# Patient Record
Sex: FEMALE | Race: BLACK OR AFRICAN AMERICAN | NOT HISPANIC OR LATINO | Employment: UNEMPLOYED | URBAN - METROPOLITAN AREA
[De-identification: names, ages, dates, MRNs, and addresses within clinical notes are randomized per-mention and may not be internally consistent; named-entity substitution may affect disease eponyms.]

---

## 2022-04-10 ENCOUNTER — HISTORICAL (OUTPATIENT)
Dept: ADMINISTRATIVE | Facility: HOSPITAL | Age: 52
End: 2022-04-10
Payer: MEDICAID

## 2022-04-26 VITALS
SYSTOLIC BLOOD PRESSURE: 114 MMHG | WEIGHT: 293 LBS | BODY MASS INDEX: 51.91 KG/M2 | HEIGHT: 63 IN | DIASTOLIC BLOOD PRESSURE: 87 MMHG | OXYGEN SATURATION: 99 %

## 2022-07-01 DIAGNOSIS — M06.9 RHEUMATOID ARTHRITIS, INVOLVING UNSPECIFIED SITE, UNSPECIFIED WHETHER RHEUMATOID FACTOR PRESENT: Primary | ICD-10-CM

## 2022-07-01 RX ORDER — METHOTREXATE 2.5 MG/1
15 TABLET ORAL
COMMUNITY
Start: 2021-11-02 | End: 2022-07-01 | Stop reason: SDUPTHER

## 2022-07-01 RX ORDER — METHOTREXATE 2.5 MG/1
15 TABLET ORAL
Qty: 30 TABLET | Refills: 5 | Status: SHIPPED | OUTPATIENT
Start: 2022-07-01 | End: 2023-02-09 | Stop reason: SDUPTHER

## 2022-07-01 RX ORDER — HYDROXYCHLOROQUINE SULFATE 200 MG/1
200 TABLET, FILM COATED ORAL 2 TIMES DAILY
Qty: 60 TABLET | Refills: 5 | Status: SHIPPED | OUTPATIENT
Start: 2022-07-01 | End: 2023-02-09 | Stop reason: SDUPTHER

## 2022-07-01 RX ORDER — MELOXICAM 15 MG/1
15 TABLET ORAL DAILY
COMMUNITY
Start: 2021-11-02 | End: 2022-07-01 | Stop reason: SDUPTHER

## 2022-07-01 RX ORDER — MELOXICAM 15 MG/1
15 TABLET ORAL DAILY
Qty: 30 TABLET | Refills: 5 | Status: SHIPPED | OUTPATIENT
Start: 2022-07-01 | End: 2023-02-09

## 2022-07-01 RX ORDER — HYDROXYCHLOROQUINE SULFATE 200 MG/1
200 TABLET, FILM COATED ORAL 2 TIMES DAILY
COMMUNITY
Start: 2021-11-02 | End: 2022-07-01 | Stop reason: SDUPTHER

## 2022-07-12 DIAGNOSIS — M06.9 RHEUMATOID ARTHRITIS, INVOLVING UNSPECIFIED SITE, UNSPECIFIED WHETHER RHEUMATOID FACTOR PRESENT: Primary | ICD-10-CM

## 2022-07-12 DIAGNOSIS — M79.7 FIBROMYALGIA: ICD-10-CM

## 2022-07-12 RX ORDER — TOPIRAMATE 50 MG/1
50 TABLET, FILM COATED ORAL NIGHTLY
COMMUNITY
Start: 2022-01-29 | End: 2023-02-09

## 2022-07-12 RX ORDER — TOPIRAMATE 50 MG/1
50 TABLET, FILM COATED ORAL NIGHTLY
Qty: 30 TABLET | Refills: 0 | Status: CANCELLED | OUTPATIENT
Start: 2022-07-12

## 2022-07-21 DIAGNOSIS — M79.7 FIBROMYALGIA: ICD-10-CM

## 2022-07-21 DIAGNOSIS — M06.9 RHEUMATOID ARTHRITIS, INVOLVING UNSPECIFIED SITE, UNSPECIFIED WHETHER RHEUMATOID FACTOR PRESENT: Primary | ICD-10-CM

## 2022-07-21 RX ORDER — TOPIRAMATE 25 MG/1
25 TABLET ORAL DAILY
Qty: 30 TABLET | Refills: 5 | Status: SHIPPED | OUTPATIENT
Start: 2022-07-21 | End: 2023-02-09 | Stop reason: SDUPTHER

## 2022-10-04 DIAGNOSIS — M06.9 RHEUMATOID ARTHRITIS, INVOLVING UNSPECIFIED SITE, UNSPECIFIED WHETHER RHEUMATOID FACTOR PRESENT: Primary | ICD-10-CM

## 2022-10-04 DIAGNOSIS — Z79.899 ENCOUNTER FOR LONG-TERM (CURRENT) USE OF OTHER MEDICATIONS: ICD-10-CM

## 2022-10-04 DIAGNOSIS — M79.7 FIBROMYALGIA: ICD-10-CM

## 2022-10-04 RX ORDER — OMEPRAZOLE 40 MG/1
40 CAPSULE, DELAYED RELEASE ORAL DAILY
COMMUNITY
Start: 2021-11-02 | End: 2022-10-04 | Stop reason: SDUPTHER

## 2022-10-04 RX ORDER — CYCLOBENZAPRINE HCL 10 MG
10 TABLET ORAL NIGHTLY
COMMUNITY
Start: 2022-07-18 | End: 2022-10-04 | Stop reason: SDUPTHER

## 2022-10-04 RX ORDER — CYCLOBENZAPRINE HCL 10 MG
10 TABLET ORAL NIGHTLY
Qty: 30 TABLET | Refills: 5 | Status: SHIPPED | OUTPATIENT
Start: 2022-10-04 | End: 2023-02-09

## 2022-10-04 RX ORDER — OMEPRAZOLE 40 MG/1
40 CAPSULE, DELAYED RELEASE ORAL DAILY
Qty: 30 CAPSULE | Refills: 5 | Status: SHIPPED | OUTPATIENT
Start: 2022-10-04 | End: 2023-02-09 | Stop reason: SDUPTHER

## 2022-11-29 RX ORDER — FOLIC ACID 1 MG/1
1000 TABLET ORAL EVERY MORNING
COMMUNITY
Start: 2022-09-18 | End: 2023-02-09 | Stop reason: SDUPTHER

## 2022-11-29 RX ORDER — FOLIC ACID 1 MG/1
1 TABLET ORAL EVERY MORNING
Qty: 30 TABLET | Refills: 5 | OUTPATIENT
Start: 2022-11-29

## 2022-12-08 ENCOUNTER — TELEPHONE (OUTPATIENT)
Dept: RHEUMATOLOGY | Facility: CLINIC | Age: 52
End: 2022-12-08
Payer: MEDICAID

## 2022-12-08 NOTE — TELEPHONE ENCOUNTER
Patients pharmacy requesting refill hydroxychloroquine 200mg BID with food or milk.     Patient has not been seen since July and doesn't have a follow up .. please schedule .. thanks

## 2023-02-06 RX ORDER — ADALIMUMAB 40MG/0.4ML
40 KIT SUBCUTANEOUS
COMMUNITY
Start: 2023-01-04 | End: 2023-02-09 | Stop reason: SDUPTHER

## 2023-02-09 ENCOUNTER — OFFICE VISIT (OUTPATIENT)
Dept: RHEUMATOLOGY | Facility: CLINIC | Age: 53
End: 2023-02-09
Payer: MEDICAID

## 2023-02-09 VITALS
OXYGEN SATURATION: 98 % | TEMPERATURE: 98 F | HEIGHT: 62 IN | WEIGHT: 293 LBS | SYSTOLIC BLOOD PRESSURE: 116 MMHG | DIASTOLIC BLOOD PRESSURE: 79 MMHG | BODY MASS INDEX: 53.92 KG/M2 | HEART RATE: 80 BPM

## 2023-02-09 DIAGNOSIS — M79.7 FIBROMYALGIA: ICD-10-CM

## 2023-02-09 DIAGNOSIS — M79.7 FIBROMYALGIA SYNDROME: ICD-10-CM

## 2023-02-09 DIAGNOSIS — Z79.899 ENCOUNTER FOR LONG-TERM (CURRENT) USE OF OTHER MEDICATIONS: ICD-10-CM

## 2023-02-09 DIAGNOSIS — M06.9 RHEUMATOID ARTHRITIS, INVOLVING UNSPECIFIED SITE, UNSPECIFIED WHETHER RHEUMATOID FACTOR PRESENT: ICD-10-CM

## 2023-02-09 DIAGNOSIS — M05.9 SEROPOSITIVE RHEUMATOID ARTHRITIS: Primary | ICD-10-CM

## 2023-02-09 DIAGNOSIS — M51.37 DISC DISEASE, DEGENERATIVE, LUMBAR OR LUMBOSACRAL: ICD-10-CM

## 2023-02-09 PROCEDURE — 99213 OFFICE O/P EST LOW 20 MIN: CPT | Mod: PBBFAC | Performed by: INTERNAL MEDICINE

## 2023-02-09 PROCEDURE — 4010F PR ACE/ARB THEARPY RXD/TAKEN: ICD-10-PCS | Mod: CPTII,,, | Performed by: INTERNAL MEDICINE

## 2023-02-09 PROCEDURE — 3008F BODY MASS INDEX DOCD: CPT | Mod: CPTII,,, | Performed by: INTERNAL MEDICINE

## 2023-02-09 PROCEDURE — 1159F MED LIST DOCD IN RCRD: CPT | Mod: CPTII,,, | Performed by: INTERNAL MEDICINE

## 2023-02-09 PROCEDURE — 99214 OFFICE O/P EST MOD 30 MIN: CPT | Mod: S$PBB,,, | Performed by: INTERNAL MEDICINE

## 2023-02-09 PROCEDURE — 3078F DIAST BP <80 MM HG: CPT | Mod: CPTII,,, | Performed by: INTERNAL MEDICINE

## 2023-02-09 PROCEDURE — 3074F PR MOST RECENT SYSTOLIC BLOOD PRESSURE < 130 MM HG: ICD-10-PCS | Mod: CPTII,,, | Performed by: INTERNAL MEDICINE

## 2023-02-09 PROCEDURE — 3074F SYST BP LT 130 MM HG: CPT | Mod: CPTII,,, | Performed by: INTERNAL MEDICINE

## 2023-02-09 PROCEDURE — 99999 PR PBB SHADOW E&M-EST. PATIENT-LVL III: CPT | Mod: PBBFAC,,, | Performed by: INTERNAL MEDICINE

## 2023-02-09 PROCEDURE — 3078F PR MOST RECENT DIASTOLIC BLOOD PRESSURE < 80 MM HG: ICD-10-PCS | Mod: CPTII,,, | Performed by: INTERNAL MEDICINE

## 2023-02-09 PROCEDURE — 3008F PR BODY MASS INDEX (BMI) DOCUMENTED: ICD-10-PCS | Mod: CPTII,,, | Performed by: INTERNAL MEDICINE

## 2023-02-09 PROCEDURE — 99999 PR PBB SHADOW E&M-EST. PATIENT-LVL III: ICD-10-PCS | Mod: PBBFAC,,, | Performed by: INTERNAL MEDICINE

## 2023-02-09 PROCEDURE — 4010F ACE/ARB THERAPY RXD/TAKEN: CPT | Mod: CPTII,,, | Performed by: INTERNAL MEDICINE

## 2023-02-09 PROCEDURE — 99214 PR OFFICE/OUTPT VISIT, EST, LEVL IV, 30-39 MIN: ICD-10-PCS | Mod: S$PBB,,, | Performed by: INTERNAL MEDICINE

## 2023-02-09 PROCEDURE — 1159F PR MEDICATION LIST DOCUMENTED IN MEDICAL RECORD: ICD-10-PCS | Mod: CPTII,,, | Performed by: INTERNAL MEDICINE

## 2023-02-09 RX ORDER — OMEPRAZOLE 40 MG/1
40 CAPSULE, DELAYED RELEASE ORAL DAILY
Qty: 30 CAPSULE | Refills: 5 | Status: SHIPPED | OUTPATIENT
Start: 2023-02-09 | End: 2023-06-07 | Stop reason: SDUPTHER

## 2023-02-09 RX ORDER — HYDROXYCHLOROQUINE SULFATE 200 MG/1
200 TABLET, FILM COATED ORAL 2 TIMES DAILY
Qty: 60 TABLET | Refills: 5 | Status: SHIPPED | OUTPATIENT
Start: 2023-02-09 | End: 2023-06-07 | Stop reason: SDUPTHER

## 2023-02-09 RX ORDER — FOLIC ACID 1 MG/1
1000 TABLET ORAL EVERY MORNING
Qty: 30 TABLET | Refills: 5 | Status: SHIPPED | OUTPATIENT
Start: 2023-02-09 | End: 2023-06-07 | Stop reason: SDUPTHER

## 2023-02-09 RX ORDER — METHOTREXATE 2.5 MG/1
15 TABLET ORAL
Qty: 30 TABLET | Refills: 5 | Status: SHIPPED | OUTPATIENT
Start: 2023-02-09 | End: 2023-06-07 | Stop reason: SDUPTHER

## 2023-02-09 RX ORDER — TOPIRAMATE 25 MG/1
25 TABLET ORAL NIGHTLY
Qty: 30 TABLET | Refills: 5 | Status: SHIPPED | OUTPATIENT
Start: 2023-02-09 | End: 2023-06-07 | Stop reason: SDUPTHER

## 2023-02-09 RX ORDER — ADALIMUMAB 40MG/0.4ML
40 KIT SUBCUTANEOUS WEEKLY
Qty: 4 PEN | Refills: 11 | Status: SHIPPED | OUTPATIENT
Start: 2023-02-09 | End: 2023-06-07 | Stop reason: SDUPTHER

## 2023-02-09 RX ORDER — ZOLPIDEM TARTRATE 10 MG/1
10 TABLET ORAL NIGHTLY
Qty: 30 TABLET | Refills: 5 | Status: SHIPPED | OUTPATIENT
Start: 2023-02-09 | End: 2023-06-07 | Stop reason: SDUPTHER

## 2023-02-09 NOTE — PROGRESS NOTES
"Subjective:       Patient ID: Scott Wright is a 53 y.o. female.    Chief Complaint: Follow-up (Follow up. Patient complains of pain 10/10 generalized)    The patient is complaining of joint pain involving the MCP PIP wrist elbow shoulders hips knees and ankles bilaterally.  The pain is 8/10 in intensity dull in quality and continuous.  That is associated with a morning stiffness lasting for more than 60 minutes.  Is also having difficulty maintaining a good night of sleep.  This has been associated with myalgias.  Muscle aches are 8/10 in intensity dull in quality and continuous.  They are associated with fatigue.  No fever no chills no others.  RHEUMATOID ARTHRITIS FLARING UP 1 WEEK AFTER THE HUMIRA SHOTS. SHE NEEDS THE HUMIRA WEEKLY.     Review of Systems   Constitutional:  Negative for appetite change, chills and fever.   HENT:  Negative for congestion, ear pain, mouth sores, nosebleeds and trouble swallowing.    Eyes:  Negative for photophobia and discharge.   Respiratory:  Negative for chest tightness and shortness of breath.    Cardiovascular:  Negative for chest pain.   Gastrointestinal:  Negative for abdominal pain and vomiting.   Endocrine: Negative.    Genitourinary:  Negative for hematuria.   Musculoskeletal:         As per HPI   Skin:  Negative for rash.   Neurological:  Negative for weakness.       Objective:   /79 (BP Location: Right arm, Patient Position: Sitting, BP Method: Large (Automatic))   Pulse 80   Temp 98.1 °F (36.7 °C) (Oral)   Ht 5' 2" (1.575 m)   Wt (!) 143.5 kg (316 lb 6.4 oz)   LMP  (LMP Unknown) Comment: menopause  SpO2 98%   BMI 57.87 kg/m²      Physical Exam   Constitutional: She is oriented to person, place, and time. She appears well-developed and well-nourished. No distress.   HENT:   Head: Normocephalic and atraumatic.   Right Ear: External ear normal.   Left Ear: External ear normal.   Eyes: Pupils are equal, round, and reactive to light.   Cardiovascular: Normal " rate, regular rhythm and normal heart sounds.   Pulmonary/Chest: Breath sounds normal.   Abdominal: Soft. There is no abdominal tenderness.   Musculoskeletal:      Right shoulder: Tenderness present.      Left shoulder: Tenderness present.      Right elbow: Tenderness present.      Left elbow: Tenderness present.      Right wrist: Tenderness present.      Left wrist: Tenderness present.      Cervical back: Neck supple.      Right hip: Tenderness present.      Left hip: Tenderness present.      Right knee: Tenderness present.      Left knee: Tenderness present.      Right ankle: Tenderness present.      Left ankle: Tenderness present.   Lymphadenopathy:     She has no cervical adenopathy.   Neurological: She is alert and oriented to person, place, and time. She displays normal reflexes. No cranial nerve deficit or sensory deficit. She exhibits normal muscle tone. Coordination normal.   Skin: No rash noted. No erythema.   Vitals reviewed.      Right Side Rheumatological Exam     The patient is tender to palpation of the shoulder, elbow, wrist, knee, 1st PIP, 1st MCP, 2nd PIP, 2nd MCP, 3rd PIP, 3rd MCP, 4th PIP, 4th MCP, 5th PIP, hip, ankle, 1st MTP, 2nd MTP, 3rd MTP, 4th MTP, 5th MTP, 1st toe IP, 2nd toe IP, 3rd toe IP, 4th toe IP and 5th toe IP    Left Side Rheumatological Exam     The patient is tender to palpation of the shoulder, elbow, wrist, knee, 1st PIP, 1st MCP, 2nd PIP, 2nd MCP, 3rd PIP, 3rd MCP, 4th PIP, 4th MCP, 5th PIP, 5th MCP, hip, ankle, 1st MTP, 2nd MTP, 3rd MTP, 4th MTP, 5th MTP, 1st toe IP, 2nd toe IP, 3rd toe IP, 4th toe IP and 5th toe IP.       Completed Fibromyalgia exam 18/18 tender points.  No data to display     Assessment:       1. Seropositive rheumatoid arthritis    2. Fibromyalgia syndrome    3. Disc disease, degenerative, lumbar or lumbosacral    4. Rheumatoid arthritis, involving unspecified site, unspecified whether rheumatoid factor present    5. Fibromyalgia    6. Encounter for  long-term (current) use of other medications            Medication List with Changes/Refills   New Medications    ZOLPIDEM (AMBIEN) 10 MG TAB    Take 1 tablet (10 mg total) by mouth every evening.       Start Date: 2/9/2023  End Date: 8/8/2023   Changed and/or Refilled Medications    Modified Medication Previous Medication    FOLIC ACID (FOLVITE) 1 MG TABLET folic acid (FOLVITE) 1 MG tablet       Take 1 tablet (1,000 mcg total) by mouth every morning.    Take 1,000 mcg by mouth every morning.       Start Date: 2/9/2023  End Date: --    Start Date: 9/18/2022 End Date: 2/9/2023    HUMIRA,CF, PEN 40 MG/0.4 ML PNKT HUMIRA,CF, PEN 40 mg/0.4 mL PnKt       Inject 0.4 mLs (40 mg total) into the skin once a week.    Inject 40 mg into the skin every 14 (fourteen) days.       Start Date: 2/9/2023  End Date: --    Start Date: 1/4/2023  End Date: 2/9/2023    HYDROXYCHLOROQUINE (PLAQUENIL) 200 MG TABLET hydrOXYchloroQUINE (PLAQUENIL) 200 mg tablet       Take 1 tablet (200 mg total) by mouth 2 (two) times a day.    Take 1 tablet (200 mg total) by mouth 2 (two) times a day.       Start Date: 2/9/2023  End Date: --    Start Date: 7/1/2022  End Date: 2/9/2023    METHOTREXATE 2.5 MG TAB methotrexate 2.5 MG Tab       Take 6 tablets (15 mg total) by mouth every 7 days.    Take 6 tablets (15 mg total) by mouth every 7 days.       Start Date: 2/9/2023  End Date: --    Start Date: 7/1/2022  End Date: 2/9/2023    OMEPRAZOLE (PRILOSEC) 40 MG CAPSULE omeprazole (PRILOSEC) 40 MG capsule       Take 1 capsule (40 mg total) by mouth once daily.    Take 1 capsule (40 mg total) by mouth once daily.       Start Date: 2/9/2023  End Date: --    Start Date: 10/4/2022 End Date: 2/9/2023    TOPIRAMATE (TOPAMAX) 25 MG TABLET topiramate (TOPAMAX) 25 MG tablet       Take 1 tablet (25 mg total) by mouth every evening.    Take 1 tablet (25 mg total) by mouth once daily.       Start Date: 2/9/2023  End Date: 2/9/2024    Start Date: 7/21/2022 End Date: 2/9/2023    Discontinued Medications    ADALIMUMAB (HUMIRA) 40 MG/0.4 ML SYKT    Inject 40 mg into the skin every 14 (fourteen) days.       Start Date: 2/3/2022  End Date: 2/9/2023    CYCLOBENZAPRINE (FLEXERIL) 10 MG TABLET    Take 1 tablet (10 mg total) by mouth every evening.       Start Date: 10/4/2022 End Date: 2/9/2023    MELOXICAM (MOBIC) 15 MG TABLET    Take 1 tablet (15 mg total) by mouth once daily.       Start Date: 7/1/2022  End Date: 2/9/2023    TOPIRAMATE (TOPAMAX) 50 MG TABLET    Take 50 mg by mouth nightly.       Start Date: 1/29/2022 End Date: 2/9/2023         Plan:         Problem List Items Addressed This Visit          Neuro    Disc disease, degenerative, lumbar or lumbosacral    Relevant Medications    HUMIRA,CF, PEN 40 mg/0.4 mL PnKt    hydrOXYchloroQUINE (PLAQUENIL) 200 mg tablet    methotrexate 2.5 MG Tab    omeprazole (PRILOSEC) 40 MG capsule    topiramate (TOPAMAX) 25 MG tablet    zolpidem (AMBIEN) 10 mg Tab    folic acid (FOLVITE) 1 MG tablet       Immunology/Multi System    Seropositive rheumatoid arthritis - Primary    Relevant Medications    HUMIRA,CF, PEN 40 mg/0.4 mL PnKt    hydrOXYchloroQUINE (PLAQUENIL) 200 mg tablet    methotrexate 2.5 MG Tab    omeprazole (PRILOSEC) 40 MG capsule    topiramate (TOPAMAX) 25 MG tablet    zolpidem (AMBIEN) 10 mg Tab    folic acid (FOLVITE) 1 MG tablet       Orthopedic    Fibromyalgia syndrome    Relevant Medications    HUMIRA,CF, PEN 40 mg/0.4 mL PnKt    hydrOXYchloroQUINE (PLAQUENIL) 200 mg tablet    methotrexate 2.5 MG Tab    omeprazole (PRILOSEC) 40 MG capsule    topiramate (TOPAMAX) 25 MG tablet    zolpidem (AMBIEN) 10 mg Tab    folic acid (FOLVITE) 1 MG tablet     Other Visit Diagnoses       Rheumatoid arthritis, involving unspecified site, unspecified whether rheumatoid factor present        Relevant Medications    HUMIRA,CF, PEN 40 mg/0.4 mL PnKt    hydrOXYchloroQUINE (PLAQUENIL) 200 mg tablet    methotrexate 2.5 MG Tab    omeprazole (PRILOSEC) 40 MG  capsule    topiramate (TOPAMAX) 25 MG tablet    zolpidem (AMBIEN) 10 mg Tab    folic acid (FOLVITE) 1 MG tablet    Fibromyalgia        Relevant Medications    HUMIRA,CF, PEN 40 mg/0.4 mL PnKt    hydrOXYchloroQUINE (PLAQUENIL) 200 mg tablet    methotrexate 2.5 MG Tab    omeprazole (PRILOSEC) 40 MG capsule    topiramate (TOPAMAX) 25 MG tablet    zolpidem (AMBIEN) 10 mg Tab    folic acid (FOLVITE) 1 MG tablet    Encounter for long-term (current) use of other medications        Relevant Medications    HUMIRA,CF, PEN 40 mg/0.4 mL PnKt    hydrOXYchloroQUINE (PLAQUENIL) 200 mg tablet    methotrexate 2.5 MG Tab    omeprazole (PRILOSEC) 40 MG capsule    topiramate (TOPAMAX) 25 MG tablet    zolpidem (AMBIEN) 10 mg Tab    folic acid (FOLVITE) 1 MG tablet

## 2023-04-25 ENCOUNTER — TELEPHONE (OUTPATIENT)
Dept: RHEUMATOLOGY | Facility: CLINIC | Age: 53
End: 2023-04-25
Payer: MEDICAID

## 2023-04-25 DIAGNOSIS — M51.37 DISC DISEASE, DEGENERATIVE, LUMBAR OR LUMBOSACRAL: Primary | ICD-10-CM

## 2023-04-25 DIAGNOSIS — M79.7 FIBROMYALGIA SYNDROME: ICD-10-CM

## 2023-04-25 DIAGNOSIS — M05.9 SEROPOSITIVE RHEUMATOID ARTHRITIS: ICD-10-CM

## 2023-06-07 ENCOUNTER — OFFICE VISIT (OUTPATIENT)
Dept: RHEUMATOLOGY | Facility: CLINIC | Age: 53
End: 2023-06-07
Payer: MEDICAID

## 2023-06-07 VITALS
TEMPERATURE: 98 F | SYSTOLIC BLOOD PRESSURE: 138 MMHG | BODY MASS INDEX: 51.91 KG/M2 | OXYGEN SATURATION: 97 % | WEIGHT: 293 LBS | HEIGHT: 63 IN | DIASTOLIC BLOOD PRESSURE: 86 MMHG

## 2023-06-07 DIAGNOSIS — M06.9 RHEUMATOID ARTHRITIS, INVOLVING UNSPECIFIED SITE, UNSPECIFIED WHETHER RHEUMATOID FACTOR PRESENT: ICD-10-CM

## 2023-06-07 DIAGNOSIS — M05.9 SEROPOSITIVE RHEUMATOID ARTHRITIS: Primary | ICD-10-CM

## 2023-06-07 DIAGNOSIS — Z79.899 ENCOUNTER FOR LONG-TERM (CURRENT) USE OF OTHER MEDICATIONS: ICD-10-CM

## 2023-06-07 DIAGNOSIS — M79.7 FIBROMYALGIA: ICD-10-CM

## 2023-06-07 DIAGNOSIS — M51.37 DISC DISEASE, DEGENERATIVE, LUMBAR OR LUMBOSACRAL: ICD-10-CM

## 2023-06-07 DIAGNOSIS — M79.7 FIBROMYALGIA SYNDROME: ICD-10-CM

## 2023-06-07 PROCEDURE — 99214 OFFICE O/P EST MOD 30 MIN: CPT | Mod: S$PBB,,, | Performed by: INTERNAL MEDICINE

## 2023-06-07 PROCEDURE — 4010F ACE/ARB THERAPY RXD/TAKEN: CPT | Mod: CPTII,,, | Performed by: INTERNAL MEDICINE

## 2023-06-07 PROCEDURE — 3075F SYST BP GE 130 - 139MM HG: CPT | Mod: CPTII,,, | Performed by: INTERNAL MEDICINE

## 2023-06-07 PROCEDURE — 1159F PR MEDICATION LIST DOCUMENTED IN MEDICAL RECORD: ICD-10-PCS | Mod: CPTII,,, | Performed by: INTERNAL MEDICINE

## 2023-06-07 PROCEDURE — 3075F PR MOST RECENT SYSTOLIC BLOOD PRESS GE 130-139MM HG: ICD-10-PCS | Mod: CPTII,,, | Performed by: INTERNAL MEDICINE

## 2023-06-07 PROCEDURE — 3008F BODY MASS INDEX DOCD: CPT | Mod: CPTII,,, | Performed by: INTERNAL MEDICINE

## 2023-06-07 PROCEDURE — 1159F MED LIST DOCD IN RCRD: CPT | Mod: CPTII,,, | Performed by: INTERNAL MEDICINE

## 2023-06-07 PROCEDURE — 3008F PR BODY MASS INDEX (BMI) DOCUMENTED: ICD-10-PCS | Mod: CPTII,,, | Performed by: INTERNAL MEDICINE

## 2023-06-07 PROCEDURE — 99214 OFFICE O/P EST MOD 30 MIN: CPT | Mod: PBBFAC | Performed by: INTERNAL MEDICINE

## 2023-06-07 PROCEDURE — 3079F DIAST BP 80-89 MM HG: CPT | Mod: CPTII,,, | Performed by: INTERNAL MEDICINE

## 2023-06-07 PROCEDURE — 3079F PR MOST RECENT DIASTOLIC BLOOD PRESSURE 80-89 MM HG: ICD-10-PCS | Mod: CPTII,,, | Performed by: INTERNAL MEDICINE

## 2023-06-07 PROCEDURE — 99999 PR PBB SHADOW E&M-EST. PATIENT-LVL IV: ICD-10-PCS | Mod: PBBFAC,,, | Performed by: INTERNAL MEDICINE

## 2023-06-07 PROCEDURE — 99214 PR OFFICE/OUTPT VISIT, EST, LEVL IV, 30-39 MIN: ICD-10-PCS | Mod: S$PBB,,, | Performed by: INTERNAL MEDICINE

## 2023-06-07 PROCEDURE — 99999 PR PBB SHADOW E&M-EST. PATIENT-LVL IV: CPT | Mod: PBBFAC,,, | Performed by: INTERNAL MEDICINE

## 2023-06-07 PROCEDURE — 4010F PR ACE/ARB THEARPY RXD/TAKEN: ICD-10-PCS | Mod: CPTII,,, | Performed by: INTERNAL MEDICINE

## 2023-06-07 RX ORDER — ERGOCALCIFEROL 1.25 MG/1
50000 CAPSULE ORAL
COMMUNITY
Start: 2023-05-26

## 2023-06-07 RX ORDER — HYDROXYCHLOROQUINE SULFATE 200 MG/1
200 TABLET, FILM COATED ORAL 2 TIMES DAILY
Qty: 60 TABLET | Refills: 5 | Status: SHIPPED | OUTPATIENT
Start: 2023-06-07

## 2023-06-07 RX ORDER — GABAPENTIN 100 MG/1
100 CAPSULE ORAL 2 TIMES DAILY
Qty: 60 CAPSULE | Refills: 5 | Status: SHIPPED | OUTPATIENT
Start: 2023-06-07 | End: 2024-06-06

## 2023-06-07 RX ORDER — EVOLOCUMAB 140 MG/ML
INJECTION, SOLUTION SUBCUTANEOUS
COMMUNITY
Start: 2023-05-31

## 2023-06-07 RX ORDER — ALLOPURINOL 100 MG/1
100 TABLET ORAL
COMMUNITY
Start: 2021-06-24

## 2023-06-07 RX ORDER — METHOTREXATE 2.5 MG/1
15 TABLET ORAL
Qty: 30 TABLET | Refills: 5 | Status: SHIPPED | OUTPATIENT
Start: 2023-06-07 | End: 2023-10-06 | Stop reason: SDUPTHER

## 2023-06-07 RX ORDER — CLONIDINE HYDROCHLORIDE 0.2 MG/1
0.2 TABLET ORAL 2 TIMES DAILY
COMMUNITY
Start: 2023-02-19

## 2023-06-07 RX ORDER — ADALIMUMAB 40MG/0.4ML
40 KIT SUBCUTANEOUS WEEKLY
Qty: 4 PEN | Refills: 11 | Status: SHIPPED | OUTPATIENT
Start: 2023-06-07

## 2023-06-07 RX ORDER — ATORVASTATIN CALCIUM 80 MG/1
TABLET, FILM COATED ORAL
COMMUNITY
Start: 2023-04-03

## 2023-06-07 RX ORDER — METFORMIN HYDROCHLORIDE 1000 MG/1
1000 TABLET ORAL 2 TIMES DAILY
COMMUNITY
Start: 2023-05-24

## 2023-06-07 RX ORDER — LOSARTAN POTASSIUM 100 MG/1
100 TABLET ORAL
COMMUNITY
Start: 2023-05-25

## 2023-06-07 RX ORDER — FOLIC ACID 1 MG/1
1000 TABLET ORAL EVERY MORNING
Qty: 30 TABLET | Refills: 5 | Status: SHIPPED | OUTPATIENT
Start: 2023-06-07

## 2023-06-07 RX ORDER — OMEPRAZOLE 40 MG/1
40 CAPSULE, DELAYED RELEASE ORAL DAILY
Qty: 30 CAPSULE | Refills: 5 | Status: SHIPPED | OUTPATIENT
Start: 2023-06-07

## 2023-06-07 RX ORDER — ZOLPIDEM TARTRATE 10 MG/1
10 TABLET ORAL NIGHTLY
Qty: 30 TABLET | Refills: 5 | Status: SHIPPED | OUTPATIENT
Start: 2023-06-07 | End: 2023-12-04

## 2023-06-07 RX ORDER — EZETIMIBE 10 MG/1
10 TABLET ORAL
COMMUNITY
Start: 2023-05-23

## 2023-06-07 RX ORDER — TRIAMTERENE/HYDROCHLOROTHIAZID 37.5-25 MG
1 TABLET ORAL
COMMUNITY
Start: 2023-05-25

## 2023-06-07 RX ORDER — BUMETANIDE 1 MG/1
1 TABLET ORAL
COMMUNITY
Start: 2023-05-23

## 2023-06-07 RX ORDER — HYDROCODONE BITARTRATE AND ACETAMINOPHEN 10; 325 MG/1; MG/1
1 TABLET ORAL
COMMUNITY
Start: 2023-05-09

## 2023-06-07 RX ORDER — TOPIRAMATE 25 MG/1
25 TABLET ORAL NIGHTLY
Qty: 30 TABLET | Refills: 5 | Status: SHIPPED | OUTPATIENT
Start: 2023-06-07 | End: 2024-06-06

## 2023-06-07 RX ORDER — GLIPIZIDE 10 MG/1
10 TABLET, FILM COATED, EXTENDED RELEASE ORAL
COMMUNITY
Start: 2021-06-24

## 2023-06-07 RX ORDER — SEMAGLUTIDE 0.68 MG/ML
INJECTION, SOLUTION SUBCUTANEOUS
COMMUNITY
Start: 2023-06-05

## 2023-06-07 NOTE — PROGRESS NOTES
"Subjective:       Patient ID: Scott Wright is a 53 y.o. female.    Chief Complaint: Pain (Pt states that her left side of her hip and back hurts. )    The patient is complaining of joint pain involving the MCP PIP wrist elbow shoulders hips knees and ankles bilaterally.  The pain is 2/10 in intensity dull in quality and continuous.  That is associated with a morning stiffness lasting for more than 60 minutes.  Is also having difficulty maintaining a good night of sleep.  This has been associated with myalgias.  Muscle aches are 7/10 in intensity dull in quality and continuous.  They are associated with fatigue.  No fever no chills no others.  Labs checked during this visit       Review of Systems   Constitutional:  Negative for appetite change, chills and fever.   HENT:  Negative for congestion, ear pain, mouth sores, nosebleeds and trouble swallowing.    Eyes:  Negative for photophobia and discharge.   Respiratory:  Negative for chest tightness and shortness of breath.    Cardiovascular:  Negative for chest pain.   Gastrointestinal:  Negative for abdominal pain and vomiting.   Endocrine: Negative.    Genitourinary:  Negative for hematuria.   Musculoskeletal:         As per HPI   Skin:  Negative for rash.   Neurological:  Negative for weakness.       Objective:   /86 (BP Location: Right arm)   Temp 98 °F (36.7 °C) (Oral)   Ht 5' 3" (1.6 m)   Wt (!) 141.3 kg (311 lb 6.4 oz)   SpO2 97%   BMI 55.16 kg/m²      Physical Exam   Constitutional: She is oriented to person, place, and time. She appears well-developed and well-nourished. No distress.   HENT:   Head: Normocephalic and atraumatic.   Right Ear: External ear normal.   Left Ear: External ear normal.   Eyes: Pupils are equal, round, and reactive to light.   Cardiovascular: Normal rate, regular rhythm and normal heart sounds.   Pulmonary/Chest: Breath sounds normal.   Abdominal: Soft. There is no abdominal tenderness.   Musculoskeletal:      Right shoulder: " Tenderness present.      Left shoulder: Tenderness present.      Right elbow: Tenderness present.      Left elbow: Tenderness present.      Right wrist: Tenderness present.      Left wrist: Tenderness present.      Cervical back: Neck supple.      Right hip: Tenderness present.      Left hip: Tenderness present.      Right knee: Tenderness present.      Left knee: Tenderness present.      Right ankle: Tenderness present.      Left ankle: Tenderness present.   Lymphadenopathy:     She has no cervical adenopathy.   Neurological: She is alert and oriented to person, place, and time. She displays normal reflexes. No cranial nerve deficit or sensory deficit. She exhibits normal muscle tone. Coordination normal.   Skin: No rash noted. No erythema.   Vitals reviewed.      Right Side Rheumatological Exam     The patient is tender to palpation of the shoulder, elbow, wrist, knee, 1st PIP, 1st MCP, 2nd PIP, 2nd MCP, 3rd PIP, 3rd MCP, 4th PIP, 4th MCP, 5th PIP, hip, ankle, 1st MTP, 2nd MTP, 3rd MTP, 4th MTP, 5th MTP, 1st toe IP, 2nd toe IP, 3rd toe IP, 4th toe IP and 5th toe IP    Left Side Rheumatological Exam     The patient is tender to palpation of the shoulder, elbow, wrist, knee, 1st PIP, 1st MCP, 2nd PIP, 2nd MCP, 3rd PIP, 3rd MCP, 4th PIP, 4th MCP, 5th PIP, 5th MCP, hip, ankle, 1st MTP, 2nd MTP, 3rd MTP, 4th MTP, 5th MTP, 1st toe IP, 2nd toe IP, 3rd toe IP, 4th toe IP and 5th toe IP.       Completed Fibromyalgia exam 19/18 tender points.  No data to display     Assessment:       1. Seropositive rheumatoid arthritis    2. Fibromyalgia syndrome    3. Disc disease, degenerative, lumbar or lumbosacral    4. Rheumatoid arthritis, involving unspecified site, unspecified whether rheumatoid factor present    5. Fibromyalgia    6. Encounter for long-term (current) use of other medications          Medication List with Changes/Refills   New Medications    GABAPENTIN (NEURONTIN) 100 MG CAPSULE    Take 1 capsule (100 mg total) by  mouth 2 (two) times daily.       Start Date: 2023  End Date: 2024   Current Medications    ALLOPURINOL (ZYLOPRIM) 100 MG TABLET    Take 100 mg by mouth.       Start Date: 2021 End Date: --    ATORVASTATIN (LIPITOR) 80 MG TABLET    Take by mouth.       Start Date: 4/3/2023  End Date: --    BUMETANIDE (BUMEX) 1 MG TABLET    Take 1 mg by mouth.       Start Date: 2023 End Date: --    CLONIDINE (CATAPRES) 0.2 MG TABLET    Take 0.2 mg by mouth 2 (two) times daily.       Start Date: 2023 End Date: --    ERGOCALCIFEROL (ERGOCALCIFEROL) 50,000 UNIT CAP    Take 50,000 Units by mouth 3 (three) times a week.       Start Date: 2023 End Date: --    EZETIMIBE (ZETIA) 10 MG TABLET    Take 10 mg by mouth.       Start Date: 2023 End Date: --    GLIPIZIDE (GLUCOTROL) 10 MG TR24    Take 10 mg by mouth.       Start Date: 2021 End Date: --    HYDROCODONE-ACETAMINOPHEN (NORCO)  MG PER TABLET    Take 1 tablet by mouth.       Start Date: 2023  End Date: --    LOSARTAN (COZAAR) 100 MG TABLET    Take 100 mg by mouth.       Start Date: 2023 End Date: --    METFORMIN (GLUCOPHAGE) 1000 MG TABLET    Take 1,000 mg by mouth 2 (two) times daily.       Start Date: 2023 End Date: --    OZEMPIC 0.25 MG OR 0.5 MG (2 MG/3 ML) PEN INJECTOR    Inject into the skin.       Start Date: 2023  End Date: --    REPATHA SYRINGE 140 MG/ML SYRG    SMARTSI Milligram(s) SUB-Q Every 2 Weeks       Start Date: 2023 End Date: --    TRIAMTERENE-HYDROCHLOROTHIAZIDE 37.5-25 MG (MAXZIDE-25) 37.5-25 MG PER TABLET    Take 1 tablet by mouth.       Start Date: 2023 End Date: --   Changed and/or Refilled Medications    Modified Medication Previous Medication    FOLIC ACID (FOLVITE) 1 MG TABLET folic acid (FOLVITE) 1 MG tablet       Take 1 tablet (1,000 mcg total) by mouth every morning.    Take 1 tablet (1,000 mcg total) by mouth every morning.       Start Date: 2023  End Date: --    Start Date:  2/9/2023  End Date: 6/7/2023    HUMIRA,CF, PEN 40 MG/0.4 ML PNKT HUMIRA,CF, PEN 40 mg/0.4 mL PnKt       Inject 0.4 mLs (40 mg total) into the skin once a week.    Inject 0.4 mLs (40 mg total) into the skin once a week.       Start Date: 6/7/2023  End Date: --    Start Date: 2/9/2023  End Date: 6/7/2023    HYDROXYCHLOROQUINE (PLAQUENIL) 200 MG TABLET hydrOXYchloroQUINE (PLAQUENIL) 200 mg tablet       Take 1 tablet (200 mg total) by mouth 2 (two) times a day.    Take 1 tablet (200 mg total) by mouth 2 (two) times a day.       Start Date: 6/7/2023  End Date: --    Start Date: 2/9/2023  End Date: 6/7/2023    METHOTREXATE 2.5 MG TAB methotrexate 2.5 MG Tab       Take 6 tablets (15 mg total) by mouth every 7 days.    Take 6 tablets (15 mg total) by mouth every 7 days.       Start Date: 6/7/2023  End Date: --    Start Date: 2/9/2023  End Date: 6/7/2023    OMEPRAZOLE (PRILOSEC) 40 MG CAPSULE omeprazole (PRILOSEC) 40 MG capsule       Take 1 capsule (40 mg total) by mouth once daily.    Take 1 capsule (40 mg total) by mouth once daily.       Start Date: 6/7/2023  End Date: --    Start Date: 2/9/2023  End Date: 6/7/2023    TOPIRAMATE (TOPAMAX) 25 MG TABLET topiramate (TOPAMAX) 25 MG tablet       Take 1 tablet (25 mg total) by mouth every evening.    Take 1 tablet (25 mg total) by mouth every evening.       Start Date: 6/7/2023  End Date: 6/6/2024    Start Date: 2/9/2023  End Date: 6/7/2023    ZOLPIDEM (AMBIEN) 10 MG TAB zolpidem (AMBIEN) 10 mg Tab       Take 1 tablet (10 mg total) by mouth every evening.    Take 1 tablet (10 mg total) by mouth every evening.       Start Date: 6/7/2023  End Date: 12/4/2023    Start Date: 2/9/2023  End Date: 6/7/2023         Plan:         Problem List Items Addressed This Visit          Neuro    Disc disease, degenerative, lumbar or lumbosacral    Relevant Medications    folic acid (FOLVITE) 1 MG tablet    HUMIRA,CF, PEN 40 mg/0.4 mL PnKt    hydrOXYchloroQUINE (PLAQUENIL) 200 mg tablet     methotrexate 2.5 MG Tab    omeprazole (PRILOSEC) 40 MG capsule    topiramate (TOPAMAX) 25 MG tablet    zolpidem (AMBIEN) 10 mg Tab    gabapentin (NEURONTIN) 100 MG capsule       Immunology/Multi System    Seropositive rheumatoid arthritis - Primary    Relevant Medications    folic acid (FOLVITE) 1 MG tablet    HUMIRA,CF, PEN 40 mg/0.4 mL PnKt    hydrOXYchloroQUINE (PLAQUENIL) 200 mg tablet    methotrexate 2.5 MG Tab    omeprazole (PRILOSEC) 40 MG capsule    topiramate (TOPAMAX) 25 MG tablet    zolpidem (AMBIEN) 10 mg Tab    gabapentin (NEURONTIN) 100 MG capsule       Orthopedic    Fibromyalgia syndrome    Relevant Medications    folic acid (FOLVITE) 1 MG tablet    HUMIRA,CF, PEN 40 mg/0.4 mL PnKt    hydrOXYchloroQUINE (PLAQUENIL) 200 mg tablet    methotrexate 2.5 MG Tab    omeprazole (PRILOSEC) 40 MG capsule    topiramate (TOPAMAX) 25 MG tablet    zolpidem (AMBIEN) 10 mg Tab    gabapentin (NEURONTIN) 100 MG capsule     Other Visit Diagnoses       Rheumatoid arthritis, involving unspecified site, unspecified whether rheumatoid factor present        Relevant Medications    folic acid (FOLVITE) 1 MG tablet    HUMIRA,CF, PEN 40 mg/0.4 mL PnKt    hydrOXYchloroQUINE (PLAQUENIL) 200 mg tablet    methotrexate 2.5 MG Tab    omeprazole (PRILOSEC) 40 MG capsule    topiramate (TOPAMAX) 25 MG tablet    zolpidem (AMBIEN) 10 mg Tab    gabapentin (NEURONTIN) 100 MG capsule    Fibromyalgia        Relevant Medications    folic acid (FOLVITE) 1 MG tablet    HUMIRA,CF, PEN 40 mg/0.4 mL PnKt    hydrOXYchloroQUINE (PLAQUENIL) 200 mg tablet    methotrexate 2.5 MG Tab    omeprazole (PRILOSEC) 40 MG capsule    topiramate (TOPAMAX) 25 MG tablet    zolpidem (AMBIEN) 10 mg Tab    gabapentin (NEURONTIN) 100 MG capsule    Encounter for long-term (current) use of other medications        Relevant Medications    folic acid (FOLVITE) 1 MG tablet    HUMIRA,CF, PEN 40 mg/0.4 mL PnKt    hydrOXYchloroQUINE (PLAQUENIL) 200 mg tablet    methotrexate 2.5  MG Tab    omeprazole (PRILOSEC) 40 MG capsule    topiramate (TOPAMAX) 25 MG tablet    zolpidem (AMBIEN) 10 mg Tab    gabapentin (NEURONTIN) 100 MG capsule

## 2023-10-06 DIAGNOSIS — M06.9 RHEUMATOID ARTHRITIS, INVOLVING UNSPECIFIED SITE, UNSPECIFIED WHETHER RHEUMATOID FACTOR PRESENT: ICD-10-CM

## 2023-10-06 DIAGNOSIS — M51.37 DISC DISEASE, DEGENERATIVE, LUMBAR OR LUMBOSACRAL: ICD-10-CM

## 2023-10-06 DIAGNOSIS — M05.9 SEROPOSITIVE RHEUMATOID ARTHRITIS: ICD-10-CM

## 2023-10-06 DIAGNOSIS — M79.7 FIBROMYALGIA SYNDROME: ICD-10-CM

## 2023-10-06 DIAGNOSIS — M79.7 FIBROMYALGIA: ICD-10-CM

## 2023-10-06 DIAGNOSIS — Z79.899 ENCOUNTER FOR LONG-TERM (CURRENT) USE OF OTHER MEDICATIONS: ICD-10-CM

## 2023-10-06 RX ORDER — METHOTREXATE 2.5 MG/1
15 TABLET ORAL
Qty: 30 TABLET | Refills: 1 | Status: SHIPPED | OUTPATIENT
Start: 2023-10-06

## 2024-06-03 ENCOUNTER — HOSPITAL ENCOUNTER (EMERGENCY)
Facility: HOSPITAL | Age: 54
Discharge: HOME | End: 2024-06-03
Attending: STUDENT IN AN ORGANIZED HEALTH CARE EDUCATION/TRAINING PROGRAM
Payer: MEDICAID

## 2024-06-03 VITALS
OXYGEN SATURATION: 98 % | WEIGHT: 293 LBS | HEART RATE: 84 BPM | BODY MASS INDEX: 51.91 KG/M2 | DIASTOLIC BLOOD PRESSURE: 112 MMHG | SYSTOLIC BLOOD PRESSURE: 165 MMHG | TEMPERATURE: 98 F | RESPIRATION RATE: 18 BRPM | HEIGHT: 63 IN

## 2024-06-03 DIAGNOSIS — M25.552 LEFT HIP PAIN: Primary | ICD-10-CM

## 2024-06-03 DIAGNOSIS — R07.89 CHEST TIGHTNESS: ICD-10-CM

## 2024-06-03 LAB
OHS QRS DURATION: 80 MS
OHS QTC CALCULATION: 433 MS
POC CARDIAC TROPONIN I: 0.01 NG/ML (ref 0–0.08)
SAMPLE: NORMAL

## 2024-06-03 PROCEDURE — 93005 ELECTROCARDIOGRAM TRACING: CPT

## 2024-06-03 PROCEDURE — 63600175 PHARM REV CODE 636 W HCPCS: Performed by: PHYSICIAN ASSISTANT

## 2024-06-03 PROCEDURE — 96372 THER/PROPH/DIAG INJ SC/IM: CPT | Performed by: PHYSICIAN ASSISTANT

## 2024-06-03 PROCEDURE — 84484 ASSAY OF TROPONIN QUANT: CPT

## 2024-06-03 PROCEDURE — 25000003 PHARM REV CODE 250: Performed by: PHYSICIAN ASSISTANT

## 2024-06-03 PROCEDURE — 99284 EMERGENCY DEPT VISIT MOD MDM: CPT | Mod: 25

## 2024-06-03 RX ORDER — ONDANSETRON 4 MG/1
8 TABLET, ORALLY DISINTEGRATING ORAL
Status: COMPLETED | OUTPATIENT
Start: 2024-06-03 | End: 2024-06-03

## 2024-06-03 RX ORDER — ASPIRIN 325 MG
325 TABLET, DELAYED RELEASE (ENTERIC COATED) ORAL
Status: COMPLETED | OUTPATIENT
Start: 2024-06-03 | End: 2024-06-03

## 2024-06-03 RX ORDER — MORPHINE SULFATE 4 MG/ML
4 INJECTION, SOLUTION INTRAMUSCULAR; INTRAVENOUS
Status: COMPLETED | OUTPATIENT
Start: 2024-06-03 | End: 2024-06-03

## 2024-06-03 RX ADMIN — ASPIRIN 325 MG: 325 TABLET, COATED ORAL at 12:06

## 2024-06-03 RX ADMIN — ONDANSETRON 8 MG: 4 TABLET, ORALLY DISINTEGRATING ORAL at 10:06

## 2024-06-03 RX ADMIN — MORPHINE SULFATE 4 MG: 4 INJECTION INTRAVENOUS at 10:06

## 2024-06-03 NOTE — ED NOTES
Pt reports she did not take her home BP meds due to wanting to avoid the diuretic effects of urinating.

## 2024-06-03 NOTE — DISCHARGE INSTRUCTIONS
Take medications as prescribed.  Follow up with PCP/rheumatology/Orthopedics as needed for evaluation of chronic left hip pain

## 2024-06-03 NOTE — ED NOTES
Patient reports that she is having chest tightness with shortness of breath. Denies PMH of same. States PMH HTN, HLD, DM, RA. GCS 15. No obvious respiratory distress. VS as recorded. Family at bedside states 45 min drive to get home and concerned about chest pain. Pt denies feeling anxious or nervous. Denies PMH of cardiac surgeries. WINSTON Keenan notified of status change.

## 2024-06-03 NOTE — ED NOTES
"Patient states chest pain/tightness feels the same as when it started. Pt states she thinks it is from the morphine injection because she "has never had that one." No respiratory distress observed. GCS 15. Family remains at bedside. Awaiting plan of care.   "

## 2024-06-03 NOTE — ED PROVIDER NOTES
Encounter Date: 6/3/2024       History     Chief Complaint   Patient presents with    Hip Pain     Left hip pain x 1 year. Reports worsening last night, denies trauma. Pt ambulatory in triage. States prescribed pain meds, has received shots with minimal relief. Hx Rheumatoid Arthritis.     54-year-old female with history of rheumatoid arthritis, hypertension, diabetes, CHF, fibromyalgia presents to ED for evaluation increasing left hip pain over the last year.  States she has been having hip pain in his seen multiple times where she was received injections as well as currently on pain medication.  Patient denies any recent fall or injury.  States that the pain worsened over the last day and that she was not able to sleep or get out of bed.  Did not take any of her medications this morning.    The history is provided by the patient. No  was used.     Review of patient's allergies indicates:   Allergen Reactions    Naproxen      Past Medical History:   Diagnosis Date    Acid reflux     Allergy     Arthritis     CHF (congestive heart failure)     Diabetes mellitus     Hyperlipidemia     Hypertension      Past Surgical History:   Procedure Laterality Date    CARPAL TUNNEL RELEASE Right     CHOLECYSTECTOMY  08/03/1998    gall stones      gall stone removal    TUBAL LIGATION       Family History   Problem Relation Name Age of Onset    Arthritis Mother Ne     Diabetes Mother Ne     Hypertension Mother Ne     Arthritis Father Sanket      Social History     Tobacco Use    Smoking status: Never    Smokeless tobacco: Never   Substance Use Topics    Alcohol use: Not Currently    Drug use: Never     Review of Systems   Constitutional:  Negative for chills, fatigue and fever.   Respiratory:  Negative for shortness of breath.    Cardiovascular:  Negative for chest pain.   Gastrointestinal:  Negative for abdominal pain, diarrhea, nausea and vomiting.   Genitourinary:  Negative for dysuria, flank pain,  frequency and urgency.   Musculoskeletal:  Positive for arthralgias, joint swelling and myalgias. Negative for back pain.   All other systems reviewed and are negative.      Physical Exam     Initial Vitals [06/03/24 0935]   BP Pulse Resp Temp SpO2   (!) 155/102 93 18 97.7 °F (36.5 °C) 96 %      MAP       --         Physical Exam    Nursing note and vitals reviewed.  Constitutional: She appears well-developed and well-nourished.   HENT:   Head: Normocephalic and atraumatic.   Right Ear: Tympanic membrane and external ear normal.   Left Ear: Tympanic membrane and external ear normal.   Mouth/Throat: Uvula is midline, oropharynx is clear and moist and mucous membranes are normal. No trismus in the jaw. No uvula swelling. No oropharyngeal exudate, posterior oropharyngeal edema or posterior oropharyngeal erythema.   Eyes: Conjunctivae are normal. Pupils are equal, round, and reactive to light.   Neck: Neck supple.   Normal range of motion.  Cardiovascular:  Normal rate, regular rhythm and normal heart sounds.           Pulmonary/Chest: Breath sounds normal. She has no wheezes. She has no rhonchi. She has no rales.   Abdominal: Abdomen is soft. Bowel sounds are normal. There is no abdominal tenderness.   Musculoskeletal:      Cervical back: Normal range of motion and neck supple.      Left hip: Tenderness present. No deformity or bony tenderness. Decreased range of motion.      Comments: Tenderness to palpation. Decreased ROM due to pain. DP pulses 2+. All other adjacent joints otherwise normal       Neurological: She is alert and oriented to person, place, and time. She has normal strength. No cranial nerve deficit or sensory deficit. GCS score is 15. GCS eye subscore is 4. GCS verbal subscore is 5. GCS motor subscore is 6.   Skin: Skin is warm and dry.   Psychiatric: She has a normal mood and affect.         ED Course   Procedures  Labs Reviewed   TROPONIN ISTAT     EKG Readings: (Independently Interpreted)   Initial  Reading: No STEMI. Rhythm: Normal Sinus Rhythm. Heart Rate: 85. Ectopy: No Ectopy. Conduction: Normal. ST Segments: Normal ST Segments. T Waves: Normal. Clinical Impression: Normal Sinus Rhythm     ECG Results              EKG 12-lead (Final result)        Collection Time Result Time QRS Duration OHS QTC Calculation    06/03/24 11:07:08 06/03/24 12:32:21 80 433                     Final result by Interface, Lab In [facility] (06/03/24 12:32:31)                   Narrative:    Test Reason : R07.89,    Vent. Rate : 085 BPM     Atrial Rate : 085 BPM     P-R Int : 204 ms          QRS Dur : 080 ms      QT Int : 364 ms       P-R-T Axes : 032 -01 -01 degrees     QTc Int : 433 ms    Normal sinus rhythm  Minimal voltage criteria for LVH, may be normal variant ( R in aVL )  Abnormal ECG  No previous ECGs available  Confirmed by Carlos Herzog MD ([id number]) on 6/3/2024 12:32:20 PM    Referred By: [email or web address]   SELF           Confirmed By:Carlos Herzog MD                                  Imaging Results              X-Ray Hip 2 or 3 views Left with Pelvis when performed (Final result)  Result time 06/03/24 10:07:17      Final result by Cali Mendoza MD (06/03/24 10:07:17)                   Impression:      Degenerative changes.      Electronically signed by: Cali Mendoza  Date:    06/03/2024  Time:    10:07               Narrative:    EXAMINATION:  XR HIP WITH PELVIS WHEN PERFORMED 2 OR 3 VIEWS LEFT    CLINICAL HISTORY:  left hip pain;    COMPARISON:  None.    FINDINGS:  No acute displaced fractures or dislocations.    There is some narrowing of the inferior medial aspect of both hip joints with minimal degenerative changes of the superolateral aspect articular spaces are otherwise preserved with smooth articular surfaces    No blastic or lytic lesions.    Soft tissues within normal limits.                                       Medications   morphine injection 4 mg (4 mg Intramuscular Given 6/3/24 1013)   ondansetron  disintegrating tablet 8 mg (8 mg Oral Given 6/3/24 1013)   aspirin EC tablet 325 mg (325 mg Oral Given 6/3/24 1208)     Medical Decision Making  54-year-old female with history of rheumatoid arthritis, hypertension, diabetes, CHF, fibromyalgia presents to ED for evaluation increasing left hip pain over the last year.  States she has been having hip pain in his seen multiple times where she was received injections as well as currently on pain medication.  Patient denies any recent fall or injury.  States that the pain worsened over the last day and that she was not able to sleep or get out of bed.  Did not take any of her medications this morning.    Differential diagnosis includes but isn't limited to left hip pain, chronic pain, fibromyalgia, rheumatoid arthritis, internal injury    Amount and/or Complexity of Data Reviewed  Radiology: ordered.  Discussion of management or test interpretation with external provider(s): Patient presents to ED for evaluation chronic left hip pain over last year.  Patient states that she is currently in pain management with rheumatoid arthritis and fibromyalgia.  Denies any new trauma or injury.  Patient was requesting x-ray.  X-ray obtained showing arthritis.  Discussed need for possible workup with MRI done on outpatient basis.  Patient giving morphine here.  After getting morphine patient states she was now having mild chest tightness.  EKG obtained showing normal sinus rhythm at rate of 85.  Troponin negative.  Patient states she feels like it was from the morphine.  In his she was ready to go.  Patient did not take her blood pressure medication today and states that she will take it once she leaves.  Discussed return ED precautions.  Patient verbalizes understanding and agrees with plan of care.      Risk  OTC drugs.  Prescription drug management.  Parenteral controlled substances.               ED Course as of 06/03/24 1708   Mon Jun 03, 2024   1100 Pain improved after morphine.  [SL]   1105 Upon discharge, patient reports to nurse that she has chest tightness. EKG and troponin obtained. [SL]      ED Course User Index  [SL] Ree Covington PA                             Clinical Impression:  Final diagnoses:  [M25.552] Left hip pain (Primary)  [R07.89] Chest tightness          ED Disposition Condition    Discharge Stable          ED Prescriptions    None       Follow-up Information       Follow up With Specialties Details Why Contact Info    Joon Thomas MD Family Medicine, Internal Medicine   [address] [place] [id number]  [phone]      Giorgio Perez MD Orthopedic Surgery In 1 week  [address] [id number]  [phone]               Ree Covington PA  06/03/24 1587

## 2024-06-03 NOTE — ED NOTES
GCS 15 with family at bedside. Reports 2 weeks left hip pain, worse last night with PMH of RA and on pain management. States she cannot get into pain management MD or rheumatologist's office for a long time due to out of office or booked. Reports takes 3-4 Norco 10mg daily for pain. Denies trauma or injury. Ambulatory at home with increased pain. No obvious deformity.

## 2024-06-03 NOTE — ED NOTES
Troponin 0.01 via POC. Notified NICK Keenan Awaiting crossover   Alert and oriented to person, place and time